# Patient Record
Sex: MALE | URBAN - METROPOLITAN AREA
[De-identification: names, ages, dates, MRNs, and addresses within clinical notes are randomized per-mention and may not be internally consistent; named-entity substitution may affect disease eponyms.]

---

## 2021-12-22 ENCOUNTER — APPOINTMENT (RX ONLY)
Dept: URBAN - METROPOLITAN AREA CLINIC 87 | Facility: CLINIC | Age: 26
Setting detail: DERMATOLOGY
End: 2021-12-22

## 2021-12-22 DIAGNOSIS — L71.0 PERIORAL DERMATITIS: ICD-10-CM

## 2021-12-22 DIAGNOSIS — L21.8 OTHER SEBORRHEIC DERMATITIS: ICD-10-CM

## 2021-12-22 PROCEDURE — ? PRESCRIPTION

## 2021-12-22 PROCEDURE — ? COUNSELING

## 2021-12-22 PROCEDURE — 99203 OFFICE O/P NEW LOW 30 MIN: CPT

## 2021-12-22 PROCEDURE — ? ADDITIONAL NOTES

## 2021-12-22 PROCEDURE — ? TREATMENT REGIMEN

## 2021-12-22 RX ORDER — CLOBETASOL PROPIONATE 0.5 MG/ML
SOLUTION TOPICAL
Qty: 50 | Refills: 0 | Status: ERX | COMMUNITY
Start: 2021-12-22

## 2021-12-22 RX ORDER — KETOCONAZOLE 20 MG/ML
SHAMPOO, SUSPENSION TOPICAL
Qty: 120 | Refills: 5 | Status: ERX | COMMUNITY
Start: 2021-12-22

## 2021-12-22 RX ORDER — DOXYCYCLINE HYCLATE 200 MG/1
TABLET, DELAYED RELEASE ORAL
Qty: 30 | Refills: 0 | Status: ERX | COMMUNITY
Start: 2021-12-22

## 2021-12-22 RX ORDER — PIMECROLIMUS 10 MG/G
CREAM TOPICAL BID
Qty: 100 | Refills: 1 | Status: ERX | COMMUNITY
Start: 2021-12-22

## 2021-12-22 RX ADMIN — CLOBETASOL PROPIONATE: 0.5 SOLUTION TOPICAL at 00:00

## 2021-12-22 RX ADMIN — KETOCONAZOLE: 20 SHAMPOO, SUSPENSION TOPICAL at 00:00

## 2021-12-22 RX ADMIN — DOXYCYCLINE HYCLATE: 200 TABLET, DELAYED RELEASE ORAL at 00:00

## 2021-12-22 RX ADMIN — PIMECROLIMUS: 10 CREAM TOPICAL at 00:00

## 2021-12-22 ASSESSMENT — LOCATION SIMPLE DESCRIPTION DERM
LOCATION SIMPLE: SCALP
LOCATION SIMPLE: LEFT SCALP

## 2021-12-22 ASSESSMENT — LOCATION DETAILED DESCRIPTION DERM
LOCATION DETAILED: LEFT MEDIAL FRONTAL SCALP
LOCATION DETAILED: LEFT SUPERIOR PARIETAL SCALP

## 2021-12-22 ASSESSMENT — LOCATION ZONE DERM: LOCATION ZONE: SCALP

## 2021-12-22 NOTE — PROCEDURE: TREATMENT REGIMEN
Initiate Treatment: Doryx 200 mg tablet,delayed release,Take 1/2 tablet once daily for 1 mth\\n\\nElidel 1 % topical cream BID, Apply bid daily to face
Detail Level: Simple

## 2021-12-22 NOTE — PROCEDURE: ADDITIONAL NOTES
Detail Level: Simple
Render Risk Assessment In Note?: no
Additional Notes: clobetasol 0.05 % scalp,Apply to scalp once daily x 2 wks then as needed for itching\\nApply to scalp QOD, leave on for 5 minutes then rinse